# Patient Record
Sex: FEMALE | Race: OTHER | Employment: FULL TIME | URBAN - METROPOLITAN AREA
[De-identification: names, ages, dates, MRNs, and addresses within clinical notes are randomized per-mention and may not be internally consistent; named-entity substitution may affect disease eponyms.]

---

## 2020-11-16 ENCOUNTER — TELEPHONE (OUTPATIENT)
Dept: FAMILY MEDICINE | Facility: CLINIC | Age: 35
End: 2020-11-16

## 2020-11-16 ENCOUNTER — OFFICE VISIT (OUTPATIENT)
Dept: FAMILY MEDICINE | Facility: CLINIC | Age: 35
End: 2020-11-16
Payer: COMMERCIAL

## 2020-11-16 VITALS
BODY MASS INDEX: 43.59 KG/M2 | TEMPERATURE: 97 F | OXYGEN SATURATION: 98 % | DIASTOLIC BLOOD PRESSURE: 80 MMHG | WEIGHT: 271.25 LBS | HEIGHT: 66 IN | HEART RATE: 92 BPM | SYSTOLIC BLOOD PRESSURE: 124 MMHG

## 2020-11-16 DIAGNOSIS — M25.562 CHRONIC PAIN OF BOTH KNEES: ICD-10-CM

## 2020-11-16 DIAGNOSIS — Z11.4 SCREENING FOR HIV (HUMAN IMMUNODEFICIENCY VIRUS): ICD-10-CM

## 2020-11-16 DIAGNOSIS — G89.29 CHRONIC PAIN OF BOTH KNEES: ICD-10-CM

## 2020-11-16 DIAGNOSIS — R00.0 TACHYCARDIA: ICD-10-CM

## 2020-11-16 DIAGNOSIS — Z01.419 ENCOUNTER FOR ANNUAL ROUTINE GYNECOLOGICAL EXAMINATION: ICD-10-CM

## 2020-11-16 DIAGNOSIS — Z00.00 WELLNESS EXAMINATION: ICD-10-CM

## 2020-11-16 DIAGNOSIS — Z13.6 SCREENING FOR CARDIOVASCULAR CONDITION: ICD-10-CM

## 2020-11-16 DIAGNOSIS — Z23 NEED FOR INFLUENZA VACCINATION: Primary | ICD-10-CM

## 2020-11-16 DIAGNOSIS — M25.561 CHRONIC PAIN OF BOTH KNEES: ICD-10-CM

## 2020-11-16 PROCEDURE — 3008F PR BODY MASS INDEX (BMI) DOCUMENTED: ICD-10-PCS | Mod: CPTII,S$GLB,, | Performed by: STUDENT IN AN ORGANIZED HEALTH CARE EDUCATION/TRAINING PROGRAM

## 2020-11-16 PROCEDURE — 90686 FLU VACCINE (QUAD) GREATER THAN OR EQUAL TO 3YO PRESERVATIVE FREE IM: ICD-10-PCS | Mod: S$GLB,,, | Performed by: STUDENT IN AN ORGANIZED HEALTH CARE EDUCATION/TRAINING PROGRAM

## 2020-11-16 PROCEDURE — 1126F PR PAIN SEVERITY QUANTIFIED, NO PAIN PRESENT: ICD-10-PCS | Mod: S$GLB,,, | Performed by: STUDENT IN AN ORGANIZED HEALTH CARE EDUCATION/TRAINING PROGRAM

## 2020-11-16 PROCEDURE — 3008F BODY MASS INDEX DOCD: CPT | Mod: CPTII,S$GLB,, | Performed by: STUDENT IN AN ORGANIZED HEALTH CARE EDUCATION/TRAINING PROGRAM

## 2020-11-16 PROCEDURE — 99204 OFFICE O/P NEW MOD 45 MIN: CPT | Mod: 25,S$GLB,, | Performed by: STUDENT IN AN ORGANIZED HEALTH CARE EDUCATION/TRAINING PROGRAM

## 2020-11-16 PROCEDURE — 90686 IIV4 VACC NO PRSV 0.5 ML IM: CPT | Mod: S$GLB,,, | Performed by: STUDENT IN AN ORGANIZED HEALTH CARE EDUCATION/TRAINING PROGRAM

## 2020-11-16 PROCEDURE — 90471 FLU VACCINE (QUAD) GREATER THAN OR EQUAL TO 3YO PRESERVATIVE FREE IM: ICD-10-PCS | Mod: S$GLB,,, | Performed by: STUDENT IN AN ORGANIZED HEALTH CARE EDUCATION/TRAINING PROGRAM

## 2020-11-16 PROCEDURE — 99204 PR OFFICE/OUTPT VISIT, NEW, LEVL IV, 45-59 MIN: ICD-10-PCS | Mod: 25,S$GLB,, | Performed by: STUDENT IN AN ORGANIZED HEALTH CARE EDUCATION/TRAINING PROGRAM

## 2020-11-16 PROCEDURE — 1126F AMNT PAIN NOTED NONE PRSNT: CPT | Mod: S$GLB,,, | Performed by: STUDENT IN AN ORGANIZED HEALTH CARE EDUCATION/TRAINING PROGRAM

## 2020-11-16 PROCEDURE — 90471 IMMUNIZATION ADMIN: CPT | Mod: S$GLB,,, | Performed by: STUDENT IN AN ORGANIZED HEALTH CARE EDUCATION/TRAINING PROGRAM

## 2020-11-16 RX ORDER — AMLODIPINE BESYLATE 10 MG/1
10 TABLET ORAL DAILY
COMMUNITY

## 2020-11-16 RX ORDER — ATENOLOL 50 MG/1
50 TABLET ORAL DAILY
COMMUNITY

## 2020-11-16 NOTE — PROGRESS NOTES
" Patient ID: Shobha Lance is a 34 y.o. female. This patient is new to me.    Chief Complaint: Annual Exam, Hypertension, Knee Pain, and Joint Swelling (right knee)    Hypertension  This is a chronic problem. Episode onset: diagnosed at the age of 29. The problem has been gradually improving (patient reports trying to lose weight) since onset. The problem is controlled. Pertinent negatives include no anxiety, chest pain, headaches, malaise/fatigue, peripheral edema, shortness of breath or sweats. (Tachycardia) There are no associated agents to hypertension. Risk factors for coronary artery disease include sedentary lifestyle.   Knee Pain   Incident onset: approx 1.5 years ago. The incident occurred at home. Injury mechanism: patient feel like her knee was "popping" so she moved the knee cap, causing an effusion  The pain is present in the right knee and left knee. The pain is moderate. The pain has been fluctuating since onset. Associated symptoms include a loss of motion. Pertinent negatives include no loss of sensation, muscle weakness, numbness or tingling. She reports no foreign bodies present. The symptoms are aggravated by weight bearing. She has tried rest for the symptoms. The treatment provided mild relief.     Patient believes that left knee may be hurting from putting more weight on it while favoring the right leg.    Follows with cardiologist Dr. Barnard at [facility]. Patient is on atenolol and reports tachycardia if she tries to stop atenolol.     Past Medical History:   Diagnosis Date    Hyperlipidemia     Hypertension        History reviewed. No pertinent surgical history.    Family History   Problem Relation Age of Onset    Hypertension Father        Social History     Tobacco Use    Smoking status: Never Smoker    Smokeless tobacco: Never Used   Substance Use Topics    Alcohol use: Not Currently    Drug use: Never       Review of patient's allergies indicates:   Allergen Reactions    Lisinopril " "Other (See Comments)     Coughing        Review of Systems  Review of Systems   Constitutional: Negative for fever and malaise/fatigue.   HENT: Negative for ear pain and sinus pain.    Eyes: Negative for discharge.   Respiratory: Negative for cough and shortness of breath.    Cardiovascular: Negative for chest pain and leg swelling.   Gastrointestinal: Negative for diarrhea, nausea and vomiting.   Genitourinary: Negative for urgency.   Musculoskeletal: Negative for myalgias.   Skin: Negative for rash.   Neurological: Negative for tingling, weakness, numbness and headaches.   Psychiatric/Behavioral: Negative for depression.   All other systems reviewed and are negative.      Currently Medications  Current Outpatient Medications on File Prior to Visit   Medication Sig Dispense Refill    amLODIPine (NORVASC) 10 MG tablet Take 10 mg by mouth once daily.      atenoloL (TENORMIN) 50 MG tablet Take 50 mg by mouth once daily.       No current facility-administered medications on file prior to visit.        Physical  Exam  Vitals:    11/16/20 0817   BP: 124/80   BP Location: Left arm   Patient Position: Sitting   Pulse: 92   Temp: 97.1 °F (36.2 °C)   TempSrc: Oral   SpO2: 98%   Weight: 123.1 kg (271 lb 4.4 oz)   Height: 5' 6" (1.676 m)      Physical Exam  Vitals signs and nursing note reviewed.   Constitutional:       General: She is not in acute distress.     Appearance: She is not ill-appearing.   HENT:      Head: Normocephalic and atraumatic.      Right Ear: External ear normal.      Left Ear: External ear normal.      Nose: Nose normal.      Mouth/Throat:      Mouth: Mucous membranes are moist.   Eyes:      Extraocular Movements: Extraocular movements intact.      Conjunctiva/sclera: Conjunctivae normal.   Neck:      Musculoskeletal: Normal range of motion.   Cardiovascular:      Rate and Rhythm: Normal rate and regular rhythm.      Pulses: Normal pulses.      Heart sounds: No murmur.   Pulmonary:      Effort: " Pulmonary effort is normal. No respiratory distress.      Breath sounds: No wheezing.   Abdominal:      General: There is no distension.      Palpations: Abdomen is soft. There is no mass.      Tenderness: There is no abdominal tenderness.   Musculoskeletal:         General: No swelling.      Right knee: She exhibits normal range of motion, no swelling and no effusion.      Left knee: She exhibits normal range of motion, no swelling and no effusion.      Comments: Crepitus in the right knee with flexion and extension   Skin:     Coloration: Skin is not jaundiced.      Findings: No rash.   Neurological:      General: No focal deficit present.      Mental Status: She is alert and oriented to person, place, and time.   Psychiatric:         Mood and Affect: Mood normal.         Thought Content: Thought content normal.         Labs:    Complete Blood Count  No results found for: RBC, HGB, HCT, MCV, MCH, MCHC, RDW, PLT, MPV, GRAN, LYMPH, MONO, EOS, BASO, GRAN, LYMPH, MONO, EOSINOPHIL, BASOPHIL, DIFFMETHOD    Comprehensive Metabolic Panel  No results found for: GLU, BUN, CREATININE, NA, K, CL, PROT, ALBUMIN, BILITOT, AST, ALKPHOS, CO2, ALT, ANIONGAP, EGFRNONAA, ESTGFRAFRICA    TSH  No results found for: TSH    Imaging:  No image results found.      Assessment/Plan:      ICD-10-CM ICD-9-CM   1. Need for influenza vaccination  Z23 V04.81   2. Encounter for annual routine gynecological examination  Z01.419 V72.31   3. Screening for cardiovascular condition  Z13.6 V81.2   4. Wellness examination  Z00.00 V70.0   5. Tachycardia  R00.0 785.0   6. Screening for HIV (human immunodeficiency virus)  Z11.4 V73.89     Need for influenza vaccination  -     Influenza - Quadrivalent *Preferred* (6 months+) (PF)    Encounter for annual routine gynecological examination    Screening for cardiovascular condition  -     Lipid Panel; Future; Expected date: 11/16/2020    Wellness examination  -     Ambulatory referral/consult to Obstetrics /  Gynecology; Future; Expected date: 11/23/2020  -     CBC Auto Differential; Future  -     Comprehensive Metabolic Panel; Future; Expected date: 11/16/2020    Tachycardia  -     TSH; Future; Expected date: 11/16/2020    Screening for HIV (human immunodeficiency virus)  -     HIV 1/2 Ag/Ab (4th Gen); Future; Expected date: 11/16/2020      HTN  - on atenolol and norvasc  - diagnosed at age 29  - patient instructed to use protection with sexual intercourse and to speak with a physician if she has plans to get pregnant.   - well controlled  - follows with cardiologist Dr. Akil Thomas    Tachycardia  - patient reports a history of tachycardia when previously trying to wean off of atenolol. Will check TSH  - no tachycardia on exam today    Bilateral knee pain  - crepitus on exam of R knee  - patient reports a history of joint effusions in both knees  - was seeing a doctor in [place] and was supposed to get MRIs done, but she did not follow up.     Discussed how to stay healthy including: diet, exercise, refraining from smoking and discussed screening exams / tests needed for age, sex and family Hx.    RTC 6 months    Fran Elizabeth MD

## 2020-11-17 ENCOUNTER — TELEPHONE (OUTPATIENT)
Dept: FAMILY MEDICINE | Facility: CLINIC | Age: 35
End: 2020-11-17

## 2020-11-17 ENCOUNTER — HOSPITAL ENCOUNTER (OUTPATIENT)
Dept: RADIOLOGY | Facility: HOSPITAL | Age: 35
Discharge: HOME | End: 2020-11-17
Attending: STUDENT IN AN ORGANIZED HEALTH CARE EDUCATION/TRAINING PROGRAM
Payer: COMMERCIAL

## 2020-11-17 DIAGNOSIS — M25.569 KNEE PAIN, UNSPECIFIED CHRONICITY, UNSPECIFIED LATERALITY: ICD-10-CM

## 2020-11-17 DIAGNOSIS — M25.561 CHRONIC PAIN OF RIGHT KNEE: ICD-10-CM

## 2020-11-17 DIAGNOSIS — M25.562 CHRONIC PAIN OF BOTH KNEES: ICD-10-CM

## 2020-11-17 DIAGNOSIS — G89.29 CHRONIC PAIN OF RIGHT KNEE: ICD-10-CM

## 2020-11-17 DIAGNOSIS — M25.461 EFFUSION OF RIGHT KNEE: ICD-10-CM

## 2020-11-17 DIAGNOSIS — G89.29 CHRONIC PAIN OF BOTH KNEES: ICD-10-CM

## 2020-11-17 DIAGNOSIS — M25.561 CHRONIC PAIN OF BOTH KNEES: ICD-10-CM

## 2020-11-17 PROCEDURE — 73560 X-RAY EXAM OF KNEE 1 OR 2: CPT | Mod: TC,FY,PO,RT

## 2020-11-17 PROCEDURE — 73560 X-RAY EXAM OF KNEE 1 OR 2: CPT | Mod: TC,FY,PO,LT

## 2020-11-17 NOTE — TELEPHONE ENCOUNTER
Shaista Peters Staff   Caller: Unspecified (Today,  2:26 PM)             Type:  Patient Returning Call     Who Called:patient   Who Left Message for Patient:office   Does the patient know what this is regarding?:no   Would the patient rather a call back or a response via [facility]? Call back   Best Call Back Number:[phone]   Additional Information: n/a

## 2020-11-17 NOTE — TELEPHONE ENCOUNTER
Notified pt of results, and someone from scheduling will call pt to schedule MRI.   PT asked if a MRI of left knee could be too.

## 2020-11-17 NOTE — TELEPHONE ENCOUNTER
----- Message from Fran Elizabeth MD sent at 11/17/2020  9:39 AM CST -----  xrays of the knees did not show anything abnormal. I have ordered an MRI of the right knee.

## 2020-11-18 ENCOUNTER — TELEPHONE (OUTPATIENT)
Dept: FAMILY MEDICINE | Facility: CLINIC | Age: 35
End: 2020-11-18

## 2020-11-18 ENCOUNTER — HOSPITAL ENCOUNTER (OUTPATIENT)
Dept: RADIOLOGY | Facility: HOSPITAL | Age: 35
Discharge: HOME | End: 2020-11-18
Attending: STUDENT IN AN ORGANIZED HEALTH CARE EDUCATION/TRAINING PROGRAM
Payer: COMMERCIAL

## 2020-11-18 DIAGNOSIS — M25.461 EFFUSION OF RIGHT KNEE: ICD-10-CM

## 2020-11-18 DIAGNOSIS — G89.29 CHRONIC PAIN OF RIGHT KNEE: ICD-10-CM

## 2020-11-18 DIAGNOSIS — M25.569 KNEE PAIN, UNSPECIFIED CHRONICITY, UNSPECIFIED LATERALITY: ICD-10-CM

## 2020-11-18 DIAGNOSIS — M25.561 CHRONIC PAIN OF RIGHT KNEE: ICD-10-CM

## 2020-11-18 DIAGNOSIS — M94.20 CHONDROMALACIA: Primary | ICD-10-CM

## 2020-11-18 DIAGNOSIS — R74.01 TRANSAMINITIS: ICD-10-CM

## 2020-11-18 PROCEDURE — 73721 MRI JNT OF LWR EXTRE W/O DYE: CPT | Mod: TC,PO,LT

## 2020-11-18 PROCEDURE — 73721 MRI JNT OF LWR EXTRE W/O DYE: CPT | Mod: TC,PO,RT

## 2020-11-18 NOTE — TELEPHONE ENCOUNTER
MD LESA Chase Staff             Please call the patient regarding her MRIs. Both knees show thinning of the cartilage in the knee. Physical therapy to strengthen the muscles around the joints and weight loss will help this. Please ask if she would like a referral for physical therapy, and if so, where would she like to

## 2020-11-18 NOTE — TELEPHONE ENCOUNTER
Called patient on cell phone to discuss results. No answer. Left message requesting a return phone call with clinic phone number.     Cholesterol slightly elevated. No need to start medication at this time. Will likely improve with some weight loss.     Glucose mildly elevated. Decrease carb intake.     One liver test is elevated. Nothing to do now. We will continue to monitor it. Plan to repeat liver test in 6 months when you come back for follow up visit. I am placing the order now so that test can be done prior to our visit.     Thyroid normal.

## 2020-11-18 NOTE — TELEPHONE ENCOUNTER
notifed pt of all results. Pt stated she is fine with any PT. I suggested to patient [place] physical therapy, and pt was find with this

## 2020-11-23 ENCOUNTER — CLINICAL SUPPORT (OUTPATIENT)
Dept: REHABILITATION | Facility: HOSPITAL | Age: 35
End: 2020-11-23
Payer: COMMERCIAL

## 2020-11-23 DIAGNOSIS — M25.562 CHRONIC PAIN OF BOTH KNEES: ICD-10-CM

## 2020-11-23 DIAGNOSIS — G89.29 CHRONIC PAIN OF BOTH KNEES: ICD-10-CM

## 2020-11-23 DIAGNOSIS — R29.898 WEAKNESS OF BOTH HIPS: ICD-10-CM

## 2020-11-23 DIAGNOSIS — M25.561 CHRONIC PAIN OF BOTH KNEES: ICD-10-CM

## 2020-11-23 PROCEDURE — 97161 PT EVAL LOW COMPLEX 20 MIN: CPT | Mod: PO

## 2020-11-23 NOTE — PROGRESS NOTES
[facility]  Physical Therapy Initial Evaluation    Date: 11/23/2020   Name: Shobha Lance  Clinic Number: [id number]    Therapy Diagnosis:   Encounter Diagnoses   Name Primary?    Chronic pain of both knees     Weakness of both hips      Physician: Fran Elizabeth MD    Physician Orders: PT Eval and Treat   Medical Diagnosis from Referral: M94.20 (ICD-10-CM) - Chondromalacia  Evaluation Date: 11/23/2020  Authorization Period Expiration: 12/31/2020  Plan of Care Expiration: 1/23/2020  Visit # / Visits authorized: 1/ 20    Time In: 1:00 pm  Time Out: 1:45 pm  Total Appointment Time (timed & untimed codes): 45 minutes    Precautions: Standard    Subjective   Date of onset: R knee about 2 years ago  History of current condition - Shobha reports: Doesn't have pain constantly, just when she is doing physical activity she gets pain and swelling in both knees. On the R knee, she tried to crack it (by twisting it) one day a couple of years ago- heard a pop and got a pocket of swelling on the lateral side of the knee. Then with changing gait and activity due to pain in the R knee, the L knee started hurting. Pain really started ramping up again around March when pt reports doing more gardening and squatting down. No previous injuries to the knees, but has fractured both ankles and had some back pain while playing soccer that remains inconsistently. Does occasionally get some sciatic pain down the back of her legs- has been doing some yoga poses (piriformis stretch) and got a more ergonomic chair while sitting at work and feels that this has reduced some of her back pain.     Medical History:   Past Medical History:   Diagnosis Date    Hyperlipidemia     Hypertension        Surgical History:   Shobha Lance  has no past surgical history on file.    Medications:   Shobha has a current medication list which includes the following prescription(s): amlodipine and atenolol.    Allergies:   Review of  patient's allergies indicates:   Allergen Reactions    Lisinopril Other (See Comments)     Coughing        Imaging, X-Ray R knee: Impression: Normal study.  X-Ray L knee: Impression: No acute abnormality suggested.  MRI R knee: Impression:1. Moderate-sized joint effusion., 2. 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 Intact ligaments, tendons, and menisci.  MRI L knee: Impression: 1. Chondromalacia in the weight-bearing aspect of the medial femoral condyle as described above., 2. Otherwise unremarkable left knee MRI.    Prior Therapy: Has never done PT before  Social History: 1 flight of stairs lives with their family- parents  Occupation: works on computers all day.  Prior Level of Function: able to do everything with no restrictions and no pain  Current Level of Function: not able to exercise as she would like, otherwise able to do most things just with pain.    Pain:  Current 3/10, worst 6/10, best 0/10   Location: bilateral knees    Description: Dull  Aggravating Factors: Bending, Walking, Lifting, Squatting and going up stairs  Easing Factors: rest    Patients goals: decrease pain, get back to exercising, reduce swelling feeling in knees    Objective     Observation: Ambulated with normal gait pattern.    Posture: Increased lumbar lordosis, knees in slight valgus in standing.      Range of Motion:   Knee Left active Left Passive Right Active R passive   Flexion 108  NT   Extension -3 0 -4 0     HIP ROM WFL      Lower Extremity Strength  Right LE  Left LE    Knee extension: 5/5 Knee extension: 5/5   Knee flexion: 4+/5 Knee flexion: 4+/5   Hip flexion: 4/5 Hip flexion: 4/5   Hip extension:  4/5 Hip extension: 4/5   Hip abduction: 3+/5 Hip abduction: 3+/5   Hip adduction: NT Hip adduction NT   Ankle dorsiflexion: 5/5 Ankle dorsiflexion: 5/5   Ankle plantarflexion: 5/5-seated Ankle plantarflexion: 5/5-seated         Special Tests: stress testing on  ligs all neg- same with calos's   Left Right   Valgus Stress Test Negative- slight discomfort in medial knee, but no increased movement Negative    Varus Stress test Negative  Negative    Lachman's test Negative  Negative    Posterior Lachman Negative Negative   Josephine's Test Negative Negative   Apley's Compression NT NT   Apley's Distraction NT NT   Thessaly's Test NT NT     Step down test: valgus and shaking bilaterally. R>L      Function:    - SLS R: 10 sec  - SLS L: 26 sec  - Squat: was able to perform a mini-squat. bilateral valgus and reported R knee felt weak.  -SL squat: had valgus and shaking bilaterally. R>L      Joint Mobility: Patellar mobility: equal bilaterally. Little movement in superior and inferior directions. Compression of patella into the femur caused no additional pain.    Palpation: No tenderness noted    Flexibility:   Ely's test: R = 85 degrees ; L = 90 degrees           Limitation/Restriction for FOTO Knee Survey    Therapist reviewed FOTO scores for Shobha Lance on 11/23/2020.   FOTO documents entered into [employer] - see Media section.    Limitation Score: 63%       TREATMENT   Treatment Time In: --  Treatment Time Out: --  Total Treatment time (time-based codes) separate from Evaluation: 0 minutes      Home Exercises and Patient Education Provided    Education provided:   - Role of PT and goals of therapy    Written Home Exercises Provided: no.  Exercises were reviewed and Shobha was able to demonstrate them prior to the end of the session.  Shobha demonstrated good  understanding of the education provided.     See EMR under not provided this visit- will provide next visit.    Assessment   Shobha is a 35 y.o. female referred to outpatient Physical Therapy with a medical diagnosis of chondromalacia. Patient presents with pain in both knees with physical activity, weakness in hip musculature,tightness in quad musculature, decreased muscular control with functional activities, and decreased  balance. Pt would benefit from therapeutic exercise to improve strength in hip musculature, improve balance and muscular control with dynamic activities, and stretch tight hip musculature to improve functional mobility, decrease pain, return patient to exercising without pain.    Patient prognosis is Excellent.   Patientt will benefit from skilled outpatient Physical Therapy to address the deficits stated above and in the chart below, provide patient /family education, and to maximize patientt's level of independence.     Plan of care discussed with patient: Yes  Patient's spiritual, cultural and educational needs considered and patient is agreeable to the plan of care and goals as stated below:     Anticipated Barriers for therapy: none    Medical Necessity is demonstrated by the following  History  Co-morbidities and personal factors that may impact the plan of care Co-morbidities:   HTN    Personal Factors:   no deficits     low   Examination  Body Structures and Functions, activity limitations and participation restrictions that may impact the plan of care Body Regions:   lower extremities    Body Systems:    strength  balance    Participation Restrictions:   exercising    Activity limitations:   Learning and applying knowledge  no deficits    General Tasks and Commands  no deficits    Communication  no deficits    Mobility  no deficits    Self care  no deficits    Domestic Life  no deficits    Interactions/Relationships  no deficits    Life Areas  no deficits    Community and Social Life  no deficits         low   Clinical Presentation stable and uncomplicated low   Decision Making/ Complexity Score: low     Goals:  Short Term Goals: 4 weeks   1. Pt will understand and be compliant with HEP to promote independent management of current diagnosis.  2. Pt will have improved Ely's test ROM degrees to at least 95 bilaterally to indicate increased extensibility of quad musculature to improve flexibility and decreased  pressure on anterior knee with squatting.  3. Pt will have increased hip abduction strength by at least one grade to improve muscular control of knee valgus with dynamic activities.  4. Pt will report subjective pain levels no greater than 4/10 with ADLs.    Long Term Goals: 8 weeks   1. Pt will have reduced FOTO limitation score from 63% to 37% to indicate improved functional mobility.  2. Pt will have increased hip strength to 5/5 to improve ability to perform ADLS such as squatting down to pick items up off the floor.  3. Pt will be able to perform step down test with no valgus noted and no pain to indicate improved muscular control during dynamic activities to improve ability to return to exercising without pain and limitation.  4. Pt will report subjective pain levels no greater than 1/10 while performing ADLs.    Plan   Plan of care Certification: 11/23/2020 to 1/23/2020.    Outpatient Physical Therapy 2 times weekly for 8 weeks to include the following interventions: Electrical Stimulation IFC, Gait Training, Manual Therapy, Moist Heat/ Ice, Neuromuscular Re-ed, Orthotic Management and Training, Patient Education, Self Care, Therapeutic Activites, Therapeutic Exercise, Ultrasound and taping and dry needling.     Ana Rosa Donnelly, PT

## 2020-11-24 PROBLEM — M25.569 KNEE PAIN: Status: ACTIVE | Noted: 2020-11-24

## 2020-11-24 PROBLEM — R29.898 WEAKNESS OF BOTH HIPS: Status: ACTIVE | Noted: 2020-11-24

## 2020-11-25 ENCOUNTER — CLINICAL SUPPORT (OUTPATIENT)
Dept: REHABILITATION | Facility: HOSPITAL | Age: 35
End: 2020-11-25
Payer: COMMERCIAL

## 2020-11-25 DIAGNOSIS — M25.561 CHRONIC PAIN OF BOTH KNEES: ICD-10-CM

## 2020-11-25 DIAGNOSIS — R29.898 WEAKNESS OF BOTH HIPS: ICD-10-CM

## 2020-11-25 DIAGNOSIS — G89.29 CHRONIC PAIN OF BOTH KNEES: ICD-10-CM

## 2020-11-25 DIAGNOSIS — M25.562 CHRONIC PAIN OF BOTH KNEES: ICD-10-CM

## 2020-11-25 PROCEDURE — 97110 THERAPEUTIC EXERCISES: CPT | Mod: PO

## 2020-11-25 NOTE — PROGRESS NOTES
"  Physical Therapy Treatment Note     Name: Shobha Lance  Clinic Number: [id number]    Therapy Diagnosis:   Encounter Diagnoses   Name Primary?    Chronic pain of both knees     Weakness of both hips      Physician: Fran Elizabeth MD    Visit Date: 11/25/2020    Physician Orders: PT Eval and Treat   Medical Diagnosis from Referral: M94.20 (ICD-10-CM) - Chondromalacia  Evaluation Date: 11/23/2020  Authorization Period Expiration: 12/31/2020  Plan of Care Expiration: 1/23/2020  Visit # / Visits authorized: 1/ 20    Time In: 9:33 am  Time Out: 10:15 am  Total Billable Time: 42 minutes    Precautions: Standard    Subjective     Pt reports: her knees feel pretty good.  She was compliant with home exercise program.  Response to previous treatment: favorable- felt fine  Functional change: none at the moment    Pain: 0/10  Location: bilateral knee      Objective     Shobha received therapeutic exercises to develop strength and core stabilization for 42 minutes including:  Date  11/25/2020   VISIT 1/20   FOTO 2/5   POC EXP. DATE 1/23/2020   FACE-TO-FACE 12/23/2020       TABLE:    SAQ 2 x 10   Hip adduction 20x 3"   SLR 1 x 10   Hip abd 2 x 10   TA 2 x 10 3"   bridges 2 x 10   Hip flexor stretch 2 x 20" B   Hamstring stretch 2 x 20" B   SEATED:    LAQ 2 x 10           STANDING:                        Initials LT         Home Exercises Provided and Patient Education Provided     Education provided:   - HEP    Written Home Exercises Provided: yes.  Exercises were reviewed and Shobha was able to demonstrate them prior to the end of the session.  Shobha demonstrated good  understanding of the education provided.     See EMR under Patient Instructions for exercises provided 11/25/2020.    Assessment     Tolerated all exercises well with no increase in symptoms. Pt fatigued quickly with all exercises as she has not worked out in a while. Quad exercises were especially difficult on the L LE. Provided pt with exercises to perform " at home to continue seeing improvements in strength and endurance.  Shobha is progressing well towards her goals.   Pt prognosis is Excellent.     Pt will continue to benefit from skilled outpatient physical therapy to address the deficits listed in the problem list box on initial evaluation, provide pt/family education and to maximize pt's level of independence in the home and community environment.     Pt's spiritual, cultural and educational needs considered and pt agreeable to plan of care and goals.     Anticipated barriers to physical therapy: none    Goals:   Short Term Goals: 4 weeks   1. Pt will understand and be compliant with HEP to promote independent management of current diagnosis.  2. Pt will have improved Ely's test ROM degrees to at least 95 bilaterally to indicate increased extensibility of quad musculature to improve flexibility and decreased pressure on anterior knee with squatting.  3. Pt will have increased hip abduction strength by at least one grade to improve muscular control of knee valgus with dynamic activities.  4. Pt will report subjective pain levels no greater than 4/10 with ADLs.     Long Term Goals: 8 weeks   1. Pt will have reduced FOTO limitation score from 63% to 37% to indicate improved functional mobility.  2. Pt will have increased hip strength to 5/5 to improve ability to perform ADLS such as squatting down to pick items up off the floor.  3. Pt will be able to perform step down test with no valgus noted and no pain to indicate improved muscular control during dynamic activities to improve ability to return to exercising without pain and limitation.  4. Pt will report subjective pain levels no greater than 1/10 while performing ADLs.    Plan     Continue with PT POC as pt tolerates- knee extension and hip abduction especially.    Ana Rosa Donnelly, PT

## 2020-12-01 ENCOUNTER — CLINICAL SUPPORT (OUTPATIENT)
Dept: REHABILITATION | Facility: HOSPITAL | Age: 35
End: 2020-12-01
Payer: COMMERCIAL

## 2020-12-01 DIAGNOSIS — M25.562 CHRONIC PAIN OF BOTH KNEES: ICD-10-CM

## 2020-12-01 DIAGNOSIS — G89.29 CHRONIC PAIN OF BOTH KNEES: ICD-10-CM

## 2020-12-01 DIAGNOSIS — R29.898 WEAKNESS OF BOTH HIPS: ICD-10-CM

## 2020-12-01 DIAGNOSIS — M25.561 CHRONIC PAIN OF BOTH KNEES: ICD-10-CM

## 2020-12-01 PROCEDURE — 97110 THERAPEUTIC EXERCISES: CPT | Mod: PO

## 2020-12-01 NOTE — PROGRESS NOTES
"  Physical Therapy Treatment Note     Name: Shobha Lance  Clinic Number: [id number]    Therapy Diagnosis:   Encounter Diagnoses   Name Primary?    Chronic pain of both knees     Weakness of both hips      Physician: Fran Elizabeth MD    Visit Date: 12/1/2020    Physician Orders: PT Eval and Treat   Medical Diagnosis from Referral: M94.20 (ICD-10-CM) - Chondromalacia  Evaluation Date: 11/23/2020  Authorization Period Expiration: 12/31/2020  Plan of Care Expiration: 1/23/2020  Visit # / Visits authorized: 2/ 20    Time In: 5:45 pm  Time Out: 6:30 pm  Total Billable Time: 45 minutes    Precautions: Standard    Subjective     Pt reports: she had some muscle soreness following last treatment but no increase in knee pain.  She was compliant with home exercise program.  Response to previous treatment: favorable- some muscle soreness but no increase in knee  Functional change: none at the moment    Pain: 5/10  Location: bilateral knee      Objective     Shobha received therapeutic exercises to develop strength and core stabilization for 45 minutes including:  Date  12/1/2020 11/25/2020   VISIT 2/20 1/20   FOTO 3/5 2/5   POC EXP. DATE 1/23/2021 1/23/2020   FACE-TO-FACE 12/23/2020 12/23/2020        TABLE:     SAQ 2 x 10 2 x 10   Hip adduction 20 x 3" 20x 3"   SLR 1 x 10 1 x 10   Hip abd 2 x 10 2 x 10   TA 20 x 3" hold 2 x 10 3"   bridges 2 x 10 2 x 10   Hip flexor stretch 2 x 20" B 2 x 20" B   Hamstring stretch 3 x 20" B 2 x 20" B   SEATED:     LAQ 2 x 10 2 x 10             STANDING:                                Initials MA LT         Home Exercises Provided and Patient Education Provided     Education provided:   - HEP    Written Home Exercises Provided: yes.  Exercises were reviewed and Shobha was able to demonstrate them prior to the end of the session.  Shobha demonstrated good  understanding of the education provided.     See EMR under Patient Instructions for exercises provided 11/25/2020.    Assessment     Shobha " returns to therapy with continued reports of knee pain during normal daily activities.  She is unable to progress with exercises on this date and requires frequent rest breaks with continued weakness and fatigue noted during eccentric phase of exercises.  Pt reports performing HEP and having muscle soreness following last treatment.  She will continue with current plan of care with gradual progression of exercises to improve strength and stability of bilateral knees.    Shobha is progressing well towards her goals.   Pt prognosis is Excellent.     Pt will continue to benefit from skilled outpatient physical therapy to address the deficits listed in the problem list box on initial evaluation, provide pt/family education and to maximize pt's level of independence in the home and community environment.     Pt's spiritual, cultural and educational needs considered and pt agreeable to plan of care and goals.     Anticipated barriers to physical therapy: none    Goals:   Short Term Goals: 4 weeks   1. Pt will understand and be compliant with HEP to promote independent management of current diagnosis.  2. Pt will have improved Ely's test ROM degrees to at least 95 bilaterally to indicate increased extensibility of quad musculature to improve flexibility and decreased pressure on anterior knee with squatting.  3. Pt will have increased hip abduction strength by at least one grade to improve muscular control of knee valgus with dynamic activities.  4. Pt will report subjective pain levels no greater than 4/10 with ADLs.     Long Term Goals: 8 weeks   1. Pt will have reduced FOTO limitation score from 63% to 37% to indicate improved functional mobility.  2. Pt will have increased hip strength to 5/5 to improve ability to perform ADLS such as squatting down to pick items up off the floor.  3. Pt will be able to perform step down test with no valgus noted and no pain to indicate improved muscular control during dynamic  activities to improve ability to return to exercising without pain and limitation.  4. Pt will report subjective pain levels no greater than 1/10 while performing ADLs.    Plan     Continue with PT POC as pt tolerates.  Attempt progression of repetitions next visit.    John Washington, PT

## 2020-12-08 ENCOUNTER — CLINICAL SUPPORT (OUTPATIENT)
Dept: REHABILITATION | Facility: HOSPITAL | Age: 35
End: 2020-12-08
Payer: COMMERCIAL

## 2020-12-08 DIAGNOSIS — M25.562 CHRONIC PAIN OF BOTH KNEES: ICD-10-CM

## 2020-12-08 DIAGNOSIS — R29.898 WEAKNESS OF BOTH HIPS: ICD-10-CM

## 2020-12-08 DIAGNOSIS — G89.29 CHRONIC PAIN OF BOTH KNEES: ICD-10-CM

## 2020-12-08 DIAGNOSIS — M25.561 CHRONIC PAIN OF BOTH KNEES: ICD-10-CM

## 2020-12-08 PROCEDURE — 97110 THERAPEUTIC EXERCISES: CPT | Mod: PO

## 2020-12-08 NOTE — PROGRESS NOTES
"  Physical Therapy Treatment Note     Name: Shobha Lance  Clinic Number: [id number]    Therapy Diagnosis:   Encounter Diagnoses   Name Primary?    Chronic pain of both knees     Weakness of both hips      Physician: Fran Elizabeth MD    Visit Date: 12/8/2020    Physician Orders: PT Eval and Treat   Medical Diagnosis from Referral: M94.20 (ICD-10-CM) - Chondromalacia  Evaluation Date: 11/23/2020  Authorization Period Expiration: 12/31/2020  Plan of Care Expiration: 1/23/2020  Visit # / Visits authorized: 3/ 20    Time In: 5:49 pm  Time Out: 6:30 pm  Total Billable Time: 41 minutes    Precautions: Standard    Subjective     Pt reports: she can tell a difference and feels like the exercises are working. Is experiencing less pain with activities like going up and down stairs.  She was compliant with home exercise program.  Response to previous treatment: favorable- some muscle soreness but no increase in knee pain  Functional change: less pain with going up and down stairs.    Pain: 0/10   Location: bilateral knee      Objective     Shobha received therapeutic exercises to develop strength and core stabilization for 41 minutes including:  Date  12/8/2020 12/1/2020 11/25/2020   VISIT 3/20 2/20 1/20   FOTO 4/5 3/5 2/5   POC EXP. DATE 1/23/2021 1/23/2021 1/23/2020   FACE-TO-FACE 12/23/2020 12/23/2020 12/23/2020         TABLE:      SAQ 3 x 10 2 x 10 2 x 10   Hip adduction  20 x 3" 20x 3"   SLR 2 x 10 1 x 10 1 x 10   Hip abd 3 x 10 2 x 10 2 x 10   TA 20x 3" hold 20 x 3" hold 2 x 10 3"   bridges 2 x10 2 x 10 2 x 10   Hip flexor stretch 2 x 20" B 2 x 20" B 2 x 20" B   Hamstring stretch 3 x 20" B 3 x 20" B 2 x 20" B         SEATED:      LAQ  2 x 10 2 x 10               STANDING:                                      Initials LT MA LT         Home Exercises Provided and Patient Education Provided     Education provided:   - HEP    Written Home Exercises Provided: yes.  Exercises were reviewed and Shobha was able to demonstrate " them prior to the end of the session.  Shobha demonstrated good  understanding of the education provided.     See EMR under Patient Instructions for exercises provided 11/25/2020.    Assessment     Shobha tolerated a progression of reps with all exercises with no increase in symptoms. Still moves a little slowly through exercises, though is requiring less frequent rest breaks. Reports having been doing exercises at home and feels like they have really been helping and she can tell a difference in pain levels with everyday activities like going up and down stairs.  Shobha is progressing well towards her goals.   Pt prognosis is Excellent.     Pt will continue to benefit from skilled outpatient physical therapy to address the deficits listed in the problem list box on initial evaluation, provide pt/family education and to maximize pt's level of independence in the home and community environment.     Pt's spiritual, cultural and educational needs considered and pt agreeable to plan of care and goals.     Anticipated barriers to physical therapy: none    Goals:   Short Term Goals: 4 weeks   1. Pt will understand and be compliant with HEP to promote independent management of current diagnosis.  2. Pt will have improved Ely's test ROM degrees to at least 95 bilaterally to indicate increased extensibility of quad musculature to improve flexibility and decreased pressure on anterior knee with squatting.  3. Pt will have increased hip abduction strength by at least one grade to improve muscular control of knee valgus with dynamic activities.  4. Pt will report subjective pain levels no greater than 4/10 with ADLs.     Long Term Goals: 8 weeks   1. Pt will have reduced FOTO limitation score from 63% to 37% to indicate improved functional mobility.  2. Pt will have increased hip strength to 5/5 to improve ability to perform ADLS such as squatting down to pick items up off the floor.  3. Pt will be able to perform step down  test with no valgus noted and no pain to indicate improved muscular control during dynamic activities to improve ability to return to exercising without pain and limitation.  4. Pt will report subjective pain levels no greater than 1/10 while performing ADLs.    Plan     Continue with PT POC as pt tolerates.      Ana Rosa Donnelly, PT

## 2020-12-11 ENCOUNTER — CLINICAL SUPPORT (OUTPATIENT)
Dept: REHABILITATION | Facility: HOSPITAL | Age: 35
End: 2020-12-11
Payer: COMMERCIAL

## 2020-12-11 DIAGNOSIS — R29.898 WEAKNESS OF BOTH HIPS: Primary | ICD-10-CM

## 2020-12-11 PROCEDURE — 97110 THERAPEUTIC EXERCISES: CPT | Mod: PO,CQ

## 2020-12-11 NOTE — PROGRESS NOTES
"  Physical Therapy Treatment Note     Name: Shobha Lance  Clinic Number: [id number]    Therapy Diagnosis:   Encounter Diagnosis   Name Primary?    Weakness of both hips Yes     Physician: Fran Elizabeth MD    Visit Date: 12/11/2020    Physician Orders: PT Eval and Treat   Medical Diagnosis from Referral: M94.20 (ICD-10-CM) - Chondromalacia  Evaluation Date: 11/23/2020  Authorization Period Expiration: 12/31/2020  Plan of Care Expiration: 1/23/2020  Visit # / Visits authorized: 4 / 20    Time In: 8:05 am  Time Out: 8:45 am  Total Billable Time: 40 minutes    Precautions: Standard    Subjective     Pt reports: feeling good today, knee only bothers her if she goes up and down a lot of stairs.   She was compliant with home exercise program.  Response to previous treatment: favorable- some muscle soreness but no increase in knee pain  Functional change: less pain with going up and down stairs.    Pain: 0/10   Location: bilateral knee      Objective     Shobha received therapeutic exercises to develop strength and core stabilization for 40 minutes including:    Date  12/11/2020 12/8/2020 12/1/2020 11/25/2020   VISIT 4/20 3/20 2/20 1/20   FOTO 5/5 4/5 3/5 2/5   POC EXP. DATE 021/23/2021 1/23/2021 1/23/2021 1/23/2020   FACE-TO-FACE 12/23/2020 12/23/2020 12/23/2020 12/23/2020          TABLE:       SAQ 3 x 10 x 1# 3 x 10 2 x 10 2 x 10   Hip adduction SL 2 x 10  20 x 3" 20x 3"   SLR 2 x 10 2 x 10 1 x 10 1 x 10   SL Hip abd 2 x 10 3 x 10 2 x 10 2 x 10   TA NT 20 x 3" hold 20 x 3" hold 2 x 10 3"   Bridges 2 x 10 2 x 10 2 x 10 2 x 10   Hip flexor stretch 3 x 20" B  w/strap 2 x 20" B 2 x 20" B 2 x 20" B   Hamstring stretch 3 x 20" B 3 x 20" B 3 x 20" B 2 x 20" B          SEATED:       LAQ --  2 x 10 2 x 10   Hip Flexion --      Hamstring Curls --             STANDING:       Mini Squats       Step ups                            Initials SB 1/6 LT MA LT     Functional limitation reporting disability percentage was obtained through " use of FOTO LEFS Knee Survey indicating 64% limitation.     Home Exercises Provided and Patient Education Provided     Education provided:   - HEP    Written Home Exercises Provided: yes.  Exercises were reviewed and Shobha was able to demonstrate them prior to the end of the session.  Shobha demonstrated good  understanding of the education provided.     See EMR under Patient Instructions for exercises provided 11/25/2020.    Assessment     Shobha returns to therapy with no new complaints at this time. Pt was able to complete full routine above with increased time taken for rest breaks and slow speed of movements. Pt continues to have significant quadriceps weakness in bilateral LE's, although is improving. Pt reports she has been consistent with HEP and that it is getting easier to perform. She had a decrease on FOTO questionnaire today by one point, which mostly highlighted squatting difficulties and carrying heavy objects.     Shobha is progressing well towards her goals.   Pt prognosis is Excellent.     Pt will continue to benefit from skilled outpatient physical therapy to address the deficits listed in the problem list box on initial evaluation, provide pt/family education and to maximize pt's level of independence in the home and community environment.     Pt's spiritual, cultural and educational needs considered and pt agreeable to plan of care and goals.     Anticipated barriers to physical therapy: none    Goals:   Short Term Goals: 4 weeks   1. Pt will understand and be compliant with HEP to promote independent management of current diagnosis.  2. Pt will have improved Ely's test ROM degrees to at least 95 bilaterally to indicate increased extensibility of quad musculature to improve flexibility and decreased pressure on anterior knee with squatting.  3. Pt will have increased hip abduction strength by at least one grade to improve muscular control of knee valgus with dynamic activities.  4. Pt will  report subjective pain levels no greater than 4/10 with ADLs.     Long Term Goals: 8 weeks   1. Pt will have reduced FOTO limitation score from 63% to 37% to indicate improved functional mobility.  2. Pt will have increased hip strength to 5/5 to improve ability to perform ADLS such as squatting down to pick items up off the floor.  3. Pt will be able to perform step down test with no valgus noted and no pain to indicate improved muscular control during dynamic activities to improve ability to return to exercising without pain and limitation.  4. Pt will report subjective pain levels no greater than 1/10 while performing ADLs.    Plan     Continue with PT POC as pt tolerates.      Saba Dalal, PTA

## 2020-12-18 ENCOUNTER — CLINICAL SUPPORT (OUTPATIENT)
Dept: REHABILITATION | Facility: HOSPITAL | Age: 35
End: 2020-12-18
Payer: COMMERCIAL

## 2020-12-18 DIAGNOSIS — R29.898 WEAKNESS OF BOTH HIPS: ICD-10-CM

## 2020-12-18 DIAGNOSIS — G89.29 CHRONIC PAIN OF BOTH KNEES: ICD-10-CM

## 2020-12-18 DIAGNOSIS — M25.562 CHRONIC PAIN OF BOTH KNEES: ICD-10-CM

## 2020-12-18 DIAGNOSIS — M25.561 CHRONIC PAIN OF BOTH KNEES: ICD-10-CM

## 2020-12-18 PROCEDURE — 97110 THERAPEUTIC EXERCISES: CPT | Mod: PO

## 2020-12-18 NOTE — PROGRESS NOTES
"  Physical Therapy Treatment Note     Name: Shobha Lance  Clinic Number: [id number]    Therapy Diagnosis:   Encounter Diagnoses   Name Primary?    Chronic pain of both knees     Weakness of both hips      Physician: Fran Elizabeth MD    Visit Date: 12/18/2020    Physician Orders: PT Eval and Treat   Medical Diagnosis from Referral: M94.20 (ICD-10-CM) - Chondromalacia  Evaluation Date: 11/23/2020  Authorization Period Expiration: 12/31/2020  Plan of Care Expiration: 1/23/2020  Visit # / Visits authorized: 5 / 20    Time In: 8:05 am  Time Out: 8:45 am  Total Billable Time: 40 minutes    Precautions: Standard    Subjective     Pt reports: her legs have been feeling stronger and she has been able to go up and down stairs more fluidly with less pain.  She was compliant with home exercise program.  Response to previous treatment: favorable- some muscle soreness but no increase in knee pain  Functional change: less pain with going up and down stairs.    Pain: 0/10   Location: bilateral knee      Objective     Shobha received therapeutic exercises to develop strength and core stabilization for 40 minutes including:    Date  12/18/2020 12/11/2020 12/8/2020 12/1/2020 11/25/2020   VISIT 5/20 4/20 3/20 2/20 1/20   FOTO  5/5 4/5 3/5 2/5   POC EXP. DATE 1/23/2021 021/23/2021 1/23/2021 1/23/2021 1/23/2020   FACE-TO-FACE 12/23/2020 12/23/2020 12/23/2020 12/23/2020 12/23/2020           TABLE:        SAQ 3 x 10 x 1# 3 x 10 x 1# 3 x 10 2 x 10 2 x 10   Hip adduction SL 2 x 10 SL 2 x 10  20 x 3" 20x 3"   SLR 2 x 10 2 x 10 2 x 10 1 x 10 1 x 10   SL Hip abd 2 x 10  2 x 10 3 x 10 2 x 10 2 x 10   TA  NT 20 x 3" hold 20 x 3" hold 2 x 10 3"   Bridges 2 x 10 2 x 10 2 x 10 2 x 10 2 x 10   Hip flexor stretch  3 x 20" B  w/strap 2 x 20" B 2 x 20" B 2 x 20" B   Hamstring stretch 3 x 20" B 3 x 20" B 3 x 20" B 3 x 20" B 2 x 20" B           SEATED:        LAQ 2 x 10 x 1# --  2 x 10 2 x 10   Hip Flexion  --      Hamstring Curls 2 x 10 RTB --         "      STANDING:        Mini Squats 1 x 10 w/ball       Step ups                                Initials LT SB 1/6 LT MA LT     Functional limitation reporting disability percentage was obtained through use of [facility] LEFS Knee Survey indicating 64% limitation.     Home Exercises Provided and Patient Education Provided     Education provided:   - HEP    Written Home Exercises Provided: yes.  Exercises were reviewed and Shobha was able to demonstrate them prior to the end of the session.  Shobha demonstrated good  understanding of the education provided.     See EMR under Patient Instructions for exercises provided 11/25/2020.    Assessment     Shobha's endurance is improving her endurance and was able to move more quickly through her table exercises today and do some seated and standing exercises. She tolerated all exercises well with only slight increase in fatigue noted at the end of the session. She required only 1 cue to not go to the point where she feels pain with mini-squats. She reports navigating stairs more fluidly with less pain.    Shobha is progressing well towards her goals.   Pt prognosis is Excellent.     Pt will continue to benefit from skilled outpatient physical therapy to address the deficits listed in the problem list box on initial evaluation, provide pt/family education and to maximize pt's level of independence in the home and community environment.     Pt's spiritual, cultural and educational needs considered and pt agreeable to plan of care and goals.     Anticipated barriers to physical therapy: none    Goals:   Short Term Goals: 4 weeks   1. Pt will understand and be compliant with HEP to promote independent management of current diagnosis.  2. Pt will have improved Ely's test ROM degrees to at least 95 bilaterally to indicate increased extensibility of quad musculature to improve flexibility and decreased pressure on anterior knee with squatting.  3. Pt will have increased hip abduction  strength by at least one grade to improve muscular control of knee valgus with dynamic activities.  4. Pt will report subjective pain levels no greater than 4/10 with ADLs.     Long Term Goals: 8 weeks   1. Pt will have reduced FOTO limitation score from 63% to 37% to indicate improved functional mobility.  2. Pt will have increased hip strength to 5/5 to improve ability to perform ADLS such as squatting down to pick items up off the floor.  3. Pt will be able to perform step down test with no valgus noted and no pain to indicate improved muscular control during dynamic activities to improve ability to return to exercising without pain and limitation.  4. Pt will report subjective pain levels no greater than 1/10 while performing ADLs.    Plan     Continue with PT POC as pt tolerates.      Ana Rosa Donnelly, PT

## 2020-12-23 ENCOUNTER — DOCUMENTATION ONLY (OUTPATIENT)
Dept: REHABILITATION | Facility: HOSPITAL | Age: 35
End: 2020-12-23

## 2020-12-23 ENCOUNTER — CLINICAL SUPPORT (OUTPATIENT)
Dept: REHABILITATION | Facility: HOSPITAL | Age: 35
End: 2020-12-23
Payer: COMMERCIAL

## 2020-12-23 DIAGNOSIS — G89.29 CHRONIC PAIN OF BOTH KNEES: ICD-10-CM

## 2020-12-23 DIAGNOSIS — M25.569 KNEE PAIN, UNSPECIFIED CHRONICITY, UNSPECIFIED LATERALITY: Primary | ICD-10-CM

## 2020-12-23 DIAGNOSIS — R29.898 WEAKNESS OF BOTH HIPS: ICD-10-CM

## 2020-12-23 DIAGNOSIS — M25.561 CHRONIC PAIN OF BOTH KNEES: ICD-10-CM

## 2020-12-23 DIAGNOSIS — M25.562 CHRONIC PAIN OF BOTH KNEES: ICD-10-CM

## 2020-12-23 PROCEDURE — 97110 THERAPEUTIC EXERCISES: CPT | Mod: PO

## 2020-12-23 NOTE — PROGRESS NOTES
Face to Face PTA Conference performed with Saba Dalal PTA regarding patient's current status, overall progress, and plan of care. Pt will be seen by a physical therapist minimally every 6th visit or every 30 days.    Raven Germain, PT  12/23/2020    Face to Face PTA Conference performed with Raven Germain PT regarding patient's current status, overall progress, and plan of care. Pt will be seen by a physical therapist minimally every 6th visit or every 30 days.    Saba Dalal PTA  12/23/2020

## 2020-12-23 NOTE — PROGRESS NOTES
"  Physical Therapy Treatment Note     Name: Shobha Lance  Clinic Number: [id number]    Therapy Diagnosis:   Encounter Diagnoses   Name Primary?    Chronic pain of both knees     Weakness of both hips      Physician: Fran Elizabeth MD    Visit Date: 12/23/2020    Physician Orders: PT Eval and Treat   Medical Diagnosis from Referral: M94.20 (ICD-10-CM) - Chondromalacia  Evaluation Date: 11/23/2020  Authorization Period Expiration: 12/31/2020  Plan of Care Expiration: 1/23/2020  Visit # / Visits authorized: 5 / 20    Time In: 5:45 pm  Time Out: 6:15 pm  Total Billable Time: 30 minutes    Precautions: Standard    Subjective     Pt reports: feeling better since starting therapy, did feel a little funny after doing that last exercise during her previous visit.   She was compliant with home exercise program.  Response to previous treatment: favorable- some muscle soreness but no increase in knee pain  Functional change: less pain with going up and down stairs.    Pain: 0/10   Location: bilateral knee      Objective     Shobha received therapeutic exercises to develop strength and core stabilization for 30 minutes including:    Date  12/23/2020 12/18/2020 12/11/2020 12/8/2020 12/1/2020 11/25/2020   VISIT 6/20 5/20 4/20 3/20 2/20 1/20   FOTO --  5/5 4/5 3/5 2/5   POC EXP. DATE 01/23/2021 1/23/2021 021/23/2021 1/23/2021 1/23/2021 1/23/2020   FACE-TO-FACE 01/23/2021 12/23/2020 12/23/2020 12/23/2020 12/23/2020 12/23/2020            TABLE:         SAQ 3 x 10 x 1# 3 x 10 x 1# 3 x 10 x 1# 3 x 10 2 x 10 2 x 10   Hip adduction SL 2 x 10 SL 2 x 10 SL 2 x 10  20 x 3" 20x 3"   SLR 3 x 10 2 x 10 2 x 10 2 x 10 1 x 10 1 x 10   SL Hip abd 2 x 10 2 x 10  2 x 10 3 x 10 2 x 10 2 x 10   TA --  NT 20 x 3" hold 20 x 3" hold 2 x 10 3"   Bridges 3 x 10 2 x 10 2 x 10 2 x 10 2 x 10 2 x 10   Hip flexor stretch Not today  3 x 20" B  w/strap 2 x 20" B 2 x 20" B 2 x 20" B   Hamstring stretch 3 x 20" B 3 x 20" B 3 x 20" B 3 x 20" B 3 x 20" B 2 x 20" B "            SEATED:         LAQ 3 x 10 x 1 # 2 x 10 x 1# --  2 x 10 2 x 10   Hip Flexion --  --      Hamstring Curls 2 x 10 RTB 2 x 10 RTB --               STANDING:         Mini Squats 1 x 15 w/ ball 1 x 10 w/ball       Step ups                                    Initials DG LT SB 1/6 LT MA LT       Home Exercises Provided and Patient Education Provided     Education provided:   - HEP    Written Home Exercises Provided: yes.  Exercises were reviewed and Shobha was able to demonstrate them prior to the end of the session.  Shobha demonstrated good  understanding of the education provided.     See EMR under Patient Instructions for exercises provided 11/25/2020.    Assessment     Shobha was able to perform all of today's exercises with no increase in symptoms prior to leaving the clinic. She did require one verbal cue for positioning with side lying exercises. She was able to perform all of today's exercises with no rest breaks demonstrating an increase in her endurance.     Shobha is progressing well towards her goals.   Pt prognosis is Excellent.     Pt will continue to benefit from skilled outpatient physical therapy to address the deficits listed in the problem list box on initial evaluation, provide pt/family education and to maximize pt's level of independence in the home and community environment.     Pt's spiritual, cultural and educational needs considered and pt agreeable to plan of care and goals.     Anticipated barriers to physical therapy: none    Goals:   Short Term Goals: 4 weeks   1. Pt will understand and be compliant with HEP to promote independent management of current diagnosis. In progress, not met  2. Pt will have improved Ely's test ROM degrees to at least 95 bilaterally to indicate increased extensibility of quad musculature to improve flexibility and decreased pressure on anterior knee with squatting. In progress, not met  3. Pt will have increased hip abduction strength by at least one grade  to improve muscular control of knee valgus with dynamic activities. In progress, not met  4. Pt will report subjective pain levels no greater than 4/10 with ADLs. In progress, not met     Long Term Goals: 8 weeks   1. Pt will have reduced FOTO limitation score from 63% to 37% to indicate improved functional mobility. In progress, not met  2. Pt will have increased hip strength to 5/5 to improve ability to perform ADLS such as squatting down to pick items up off the floor. In progress, not met  3. Pt will be able to perform step down test with no valgus noted and no pain to indicate improved muscular control during dynamic activities to improve ability to return to exercising without pain and limitation. In progress, not met  4. Pt will report subjective pain levels no greater than 1/10 while performing ADLs. In progress, not met    Plan     Continue with PT POC as pt tolerates. Increase reps with all exercises next visit.     Raven Germain, PT

## 2020-12-28 ENCOUNTER — TELEPHONE (OUTPATIENT)
Dept: FAMILY MEDICINE | Facility: CLINIC | Age: 35
End: 2020-12-28

## 2021-01-04 ENCOUNTER — PATIENT MESSAGE (OUTPATIENT)
Dept: ADMINISTRATIVE | Facility: HOSPITAL | Age: 36
End: 2021-01-04

## 2021-03-08 ENCOUNTER — PATIENT MESSAGE (OUTPATIENT)
Dept: FAMILY MEDICINE | Facility: CLINIC | Age: 36
End: 2021-03-08

## 2021-03-13 ENCOUNTER — IMMUNIZATION (OUTPATIENT)
Dept: INTERNAL MEDICINE | Facility: CLINIC | Age: 36
End: 2021-03-13
Payer: COMMERCIAL

## 2021-03-13 DIAGNOSIS — Z23 NEED FOR VACCINATION: Primary | ICD-10-CM

## 2021-03-13 PROCEDURE — 91300 COVID-19, MRNA, LNP-S, PF, 30 MCG/0.3 ML DOSE VACCINE: CPT | Mod: PBBFAC | Performed by: FAMILY MEDICINE

## 2021-03-27 ENCOUNTER — NURSE TRIAGE (OUTPATIENT)
Dept: ADMINISTRATIVE | Facility: CLINIC | Age: 36
End: 2021-03-27

## 2021-04-01 ENCOUNTER — PATIENT MESSAGE (OUTPATIENT)
Dept: FAMILY MEDICINE | Facility: CLINIC | Age: 36
End: 2021-04-01

## 2021-04-03 ENCOUNTER — IMMUNIZATION (OUTPATIENT)
Dept: INTERNAL MEDICINE | Facility: CLINIC | Age: 36
End: 2021-04-03
Payer: COMMERCIAL

## 2021-04-03 DIAGNOSIS — Z23 NEED FOR VACCINATION: Primary | ICD-10-CM

## 2021-04-03 PROCEDURE — 91300 COVID-19, MRNA, LNP-S, PF, 30 MCG/0.3 ML DOSE VACCINE: CPT | Mod: PBBFAC | Performed by: FAMILY MEDICINE

## 2021-04-03 PROCEDURE — 0002A COVID-19, MRNA, LNP-S, PF, 30 MCG/0.3 ML DOSE VACCINE: CPT | Mod: PBBFAC | Performed by: FAMILY MEDICINE

## 2021-04-05 ENCOUNTER — OFFICE VISIT (OUTPATIENT)
Dept: FAMILY MEDICINE | Facility: CLINIC | Age: 36
End: 2021-04-05
Payer: COMMERCIAL

## 2021-04-05 ENCOUNTER — PATIENT MESSAGE (OUTPATIENT)
Dept: ADMINISTRATIVE | Facility: HOSPITAL | Age: 36
End: 2021-04-05

## 2021-04-05 VITALS
SYSTOLIC BLOOD PRESSURE: 134 MMHG | DIASTOLIC BLOOD PRESSURE: 86 MMHG | OXYGEN SATURATION: 96 % | HEIGHT: 66 IN | TEMPERATURE: 98 F | WEIGHT: 275.13 LBS | BODY MASS INDEX: 44.22 KG/M2 | HEART RATE: 101 BPM

## 2021-04-05 DIAGNOSIS — H81.11 BENIGN PAROXYSMAL POSITIONAL VERTIGO OF RIGHT EAR: Primary | ICD-10-CM

## 2021-04-05 PROCEDURE — 3008F BODY MASS INDEX DOCD: CPT | Mod: CPTII,S$GLB,, | Performed by: STUDENT IN AN ORGANIZED HEALTH CARE EDUCATION/TRAINING PROGRAM

## 2021-04-05 PROCEDURE — 3008F PR BODY MASS INDEX (BMI) DOCUMENTED: ICD-10-PCS | Mod: CPTII,S$GLB,, | Performed by: STUDENT IN AN ORGANIZED HEALTH CARE EDUCATION/TRAINING PROGRAM

## 2021-04-05 PROCEDURE — 99213 PR OFFICE/OUTPT VISIT, EST, LEVL III, 20-29 MIN: ICD-10-PCS | Mod: S$GLB,,, | Performed by: STUDENT IN AN ORGANIZED HEALTH CARE EDUCATION/TRAINING PROGRAM

## 2021-04-05 PROCEDURE — 99213 OFFICE O/P EST LOW 20 MIN: CPT | Mod: S$GLB,,, | Performed by: STUDENT IN AN ORGANIZED HEALTH CARE EDUCATION/TRAINING PROGRAM

## 2021-04-05 RX ORDER — MECLIZINE HYDROCHLORIDE 25 MG/1
TABLET ORAL
COMMUNITY
Start: 2021-03-30 | End: 2021-04-12

## 2021-04-12 ENCOUNTER — OFFICE VISIT (OUTPATIENT)
Dept: OBSTETRICS AND GYNECOLOGY | Facility: CLINIC | Age: 36
End: 2021-04-12
Payer: COMMERCIAL

## 2021-04-12 VITALS
WEIGHT: 273.38 LBS | SYSTOLIC BLOOD PRESSURE: 126 MMHG | DIASTOLIC BLOOD PRESSURE: 82 MMHG | BODY MASS INDEX: 44.12 KG/M2

## 2021-04-12 DIAGNOSIS — Z00.00 WELLNESS EXAMINATION: ICD-10-CM

## 2021-04-12 DIAGNOSIS — Z01.419 WELL WOMAN EXAM WITH ROUTINE GYNECOLOGICAL EXAM: Primary | ICD-10-CM

## 2021-04-12 DIAGNOSIS — Z12.4 SCREENING FOR CERVICAL CANCER: ICD-10-CM

## 2021-04-12 DIAGNOSIS — N92.6 IRREGULAR BLEEDING: ICD-10-CM

## 2021-04-12 PROCEDURE — 99385 PR PREVENTIVE VISIT,NEW,18-39: ICD-10-PCS | Mod: S$GLB,,, | Performed by: OBSTETRICS & GYNECOLOGY

## 2021-04-12 PROCEDURE — 99999 PR PBB SHADOW E&M-EST. PATIENT-LVL III: CPT | Mod: PBBFAC,,, | Performed by: OBSTETRICS & GYNECOLOGY

## 2021-04-12 PROCEDURE — 3008F PR BODY MASS INDEX (BMI) DOCUMENTED: ICD-10-PCS | Mod: CPTII,S$GLB,, | Performed by: OBSTETRICS & GYNECOLOGY

## 2021-04-12 PROCEDURE — 99385 PREV VISIT NEW AGE 18-39: CPT | Mod: S$GLB,,, | Performed by: OBSTETRICS & GYNECOLOGY

## 2021-04-12 PROCEDURE — 87624 HPV HI-RISK TYP POOLED RSLT: CPT | Performed by: OBSTETRICS & GYNECOLOGY

## 2021-04-12 PROCEDURE — 88175 CYTOPATH C/V AUTO FLUID REDO: CPT | Performed by: STUDENT IN AN ORGANIZED HEALTH CARE EDUCATION/TRAINING PROGRAM

## 2021-04-12 PROCEDURE — 88141 PR  CYTOPATH CERV/VAG INTERPRET: ICD-10-PCS | Mod: ,,, | Performed by: STUDENT IN AN ORGANIZED HEALTH CARE EDUCATION/TRAINING PROGRAM

## 2021-04-12 PROCEDURE — 99999 PR PBB SHADOW E&M-EST. PATIENT-LVL III: ICD-10-PCS | Mod: PBBFAC,,, | Performed by: OBSTETRICS & GYNECOLOGY

## 2021-04-12 PROCEDURE — 3008F BODY MASS INDEX DOCD: CPT | Mod: CPTII,S$GLB,, | Performed by: OBSTETRICS & GYNECOLOGY

## 2021-04-12 PROCEDURE — 1126F AMNT PAIN NOTED NONE PRSNT: CPT | Mod: S$GLB,,, | Performed by: OBSTETRICS & GYNECOLOGY

## 2021-04-12 PROCEDURE — 88141 CYTOPATH C/V INTERPRET: CPT | Mod: ,,, | Performed by: STUDENT IN AN ORGANIZED HEALTH CARE EDUCATION/TRAINING PROGRAM

## 2021-04-12 PROCEDURE — 1126F PR PAIN SEVERITY QUANTIFIED, NO PAIN PRESENT: ICD-10-PCS | Mod: S$GLB,,, | Performed by: OBSTETRICS & GYNECOLOGY

## 2021-04-16 ENCOUNTER — HOSPITAL ENCOUNTER (OUTPATIENT)
Dept: RADIOLOGY | Facility: HOSPITAL | Age: 36
Discharge: HOME | End: 2021-04-16
Attending: OBSTETRICS & GYNECOLOGY
Payer: COMMERCIAL

## 2021-04-16 DIAGNOSIS — N92.6 IRREGULAR BLEEDING: ICD-10-CM

## 2021-04-16 LAB
HPV HR 12 DNA SPEC QL NAA+PROBE: NEGATIVE
HPV16 AG SPEC QL: NEGATIVE
HPV18 DNA SPEC QL NAA+PROBE: NEGATIVE

## 2021-04-19 ENCOUNTER — HOSPITAL ENCOUNTER (OUTPATIENT)
Dept: RADIOLOGY | Facility: HOSPITAL | Age: 36
Discharge: HOME | End: 2021-04-19
Attending: OBSTETRICS & GYNECOLOGY
Payer: COMMERCIAL

## 2021-04-19 DIAGNOSIS — N92.6 IRREGULAR BLEEDING: ICD-10-CM

## 2021-04-19 LAB
FINAL PATHOLOGIC DIAGNOSIS: NORMAL
Lab: NORMAL

## 2021-04-19 PROCEDURE — 76856 US EXAM PELVIC COMPLETE: CPT | Mod: TC,PO

## 2021-05-18 ENCOUNTER — OFFICE VISIT (OUTPATIENT)
Dept: FAMILY MEDICINE | Facility: CLINIC | Age: 36
End: 2021-05-18
Payer: COMMERCIAL

## 2021-05-18 VITALS
HEIGHT: 66 IN | TEMPERATURE: 98 F | OXYGEN SATURATION: 95 % | DIASTOLIC BLOOD PRESSURE: 84 MMHG | BODY MASS INDEX: 44.43 KG/M2 | SYSTOLIC BLOOD PRESSURE: 128 MMHG | WEIGHT: 276.44 LBS | HEART RATE: 81 BPM

## 2021-05-18 DIAGNOSIS — J01.10 ACUTE NON-RECURRENT FRONTAL SINUSITIS: Primary | ICD-10-CM

## 2021-05-18 PROCEDURE — 1126F AMNT PAIN NOTED NONE PRSNT: CPT | Mod: S$GLB,,, | Performed by: STUDENT IN AN ORGANIZED HEALTH CARE EDUCATION/TRAINING PROGRAM

## 2021-05-18 PROCEDURE — 99214 PR OFFICE/OUTPT VISIT, EST, LEVL IV, 30-39 MIN: ICD-10-PCS | Mod: S$GLB,,, | Performed by: STUDENT IN AN ORGANIZED HEALTH CARE EDUCATION/TRAINING PROGRAM

## 2021-05-18 PROCEDURE — 3008F PR BODY MASS INDEX (BMI) DOCUMENTED: ICD-10-PCS | Mod: CPTII,S$GLB,, | Performed by: STUDENT IN AN ORGANIZED HEALTH CARE EDUCATION/TRAINING PROGRAM

## 2021-05-18 PROCEDURE — 1126F PR PAIN SEVERITY QUANTIFIED, NO PAIN PRESENT: ICD-10-PCS | Mod: S$GLB,,, | Performed by: STUDENT IN AN ORGANIZED HEALTH CARE EDUCATION/TRAINING PROGRAM

## 2021-05-18 PROCEDURE — 3008F BODY MASS INDEX DOCD: CPT | Mod: CPTII,S$GLB,, | Performed by: STUDENT IN AN ORGANIZED HEALTH CARE EDUCATION/TRAINING PROGRAM

## 2021-05-18 PROCEDURE — 99214 OFFICE O/P EST MOD 30 MIN: CPT | Mod: S$GLB,,, | Performed by: STUDENT IN AN ORGANIZED HEALTH CARE EDUCATION/TRAINING PROGRAM

## 2021-05-18 RX ORDER — FLUTICASONE PROPIONATE 50 MCG
1 SPRAY, SUSPENSION (ML) NASAL 2 TIMES DAILY
Qty: 10 ML | Refills: 0 | Status: SHIPPED | OUTPATIENT
Start: 2021-05-18 | End: 2021-06-10

## 2021-06-07 ENCOUNTER — PATIENT MESSAGE (OUTPATIENT)
Dept: FAMILY MEDICINE | Facility: CLINIC | Age: 36
End: 2021-06-07

## 2021-06-10 DIAGNOSIS — J01.10 ACUTE NON-RECURRENT FRONTAL SINUSITIS: ICD-10-CM

## 2021-06-10 RX ORDER — FLUTICASONE PROPIONATE 50 MCG
SPRAY, SUSPENSION (ML) NASAL
Qty: 16 ML | Refills: 3 | Status: SHIPPED | OUTPATIENT
Start: 2021-06-10

## 2021-09-13 ENCOUNTER — PATIENT MESSAGE (OUTPATIENT)
Dept: FAMILY MEDICINE | Facility: CLINIC | Age: 36
End: 2021-09-13

## 2021-09-13 RX ORDER — METHYLPREDNISOLONE 4 MG/1
TABLET ORAL
Qty: 1 PACKAGE | Refills: 0 | Status: SHIPPED | OUTPATIENT
Start: 2021-09-13 | End: 2021-10-04

## 2021-12-06 ENCOUNTER — IMMUNIZATION (OUTPATIENT)
Dept: PHARMACY | Facility: CLINIC | Age: 36
End: 2021-12-06
Payer: COMMERCIAL

## 2021-12-06 DIAGNOSIS — Z23 NEED FOR VACCINATION: Primary | ICD-10-CM

## 2021-12-30 ENCOUNTER — PATIENT MESSAGE (OUTPATIENT)
Dept: FAMILY MEDICINE | Facility: CLINIC | Age: 36
End: 2021-12-30
Payer: COMMERCIAL

## 2021-12-30 RX ORDER — SULFAMETHOXAZOLE AND TRIMETHOPRIM 800; 160 MG/1; MG/1
1 TABLET ORAL 2 TIMES DAILY
Qty: 10 TABLET | Refills: 0 | Status: SHIPPED | OUTPATIENT
Start: 2021-12-30 | End: 2021-12-30 | Stop reason: SDUPTHER

## 2021-12-30 RX ORDER — SULFAMETHOXAZOLE AND TRIMETHOPRIM 800; 160 MG/1; MG/1
1 TABLET ORAL 2 TIMES DAILY
Qty: 10 TABLET | Refills: 0 | Status: SHIPPED | OUTPATIENT
Start: 2021-12-30 | End: 2022-01-04

## 2021-12-30 RX ORDER — SULFAMETHOXAZOLE AND TRIMETHOPRIM 800; 160 MG/1; MG/1
1 TABLET ORAL 2 TIMES DAILY
Qty: 10 TABLET | Refills: 0 | Status: SHIPPED | OUTPATIENT
Start: 2021-12-30 | End: 2021-12-30

## 2022-01-06 ENCOUNTER — OFFICE VISIT (OUTPATIENT)
Dept: FAMILY MEDICINE | Facility: CLINIC | Age: 37
End: 2022-01-06
Payer: COMMERCIAL

## 2022-01-06 VITALS
WEIGHT: 272.81 LBS | BODY MASS INDEX: 43.84 KG/M2 | SYSTOLIC BLOOD PRESSURE: 132 MMHG | HEART RATE: 89 BPM | OXYGEN SATURATION: 97 % | HEIGHT: 66 IN | DIASTOLIC BLOOD PRESSURE: 84 MMHG | TEMPERATURE: 98 F

## 2022-01-06 DIAGNOSIS — R05.9 COUGH: Primary | ICD-10-CM

## 2022-01-06 LAB
ADENOVIRUS: NOT DETECTED
BORDETELLA PARAPERTUSSIS (IS1001): NOT DETECTED
BORDETELLA PERTUSSIS (PTXP): NOT DETECTED
CHLAMYDIA PNEUMONIAE: NOT DETECTED
CORONAVIRUS 229E, COMMON COLD VIRUS: NOT DETECTED
CORONAVIRUS HKU1, COMMON COLD VIRUS: NOT DETECTED
CORONAVIRUS NL63, COMMON COLD VIRUS: NOT DETECTED
CORONAVIRUS OC43, COMMON COLD VIRUS: NOT DETECTED
CTP QC/QA: YES
FLUBV RNA NPH QL NAA+NON-PROBE: NOT DETECTED
HPIV1 RNA NPH QL NAA+NON-PROBE: NOT DETECTED
HPIV2 RNA NPH QL NAA+NON-PROBE: NOT DETECTED
HPIV3 RNA NPH QL NAA+NON-PROBE: NOT DETECTED
HPIV4 RNA NPH QL NAA+NON-PROBE: NOT DETECTED
HUMAN METAPNEUMOVIRUS: DETECTED
INFLUENZA A (SUBTYPES H1,H1-2009,H3): NOT DETECTED
MYCOPLASMA PNEUMONIAE: NOT DETECTED
RESPIRATORY INFECTION PANEL SOURCE: ABNORMAL
RSV RNA NPH QL NAA+NON-PROBE: NOT DETECTED
RV+EV RNA NPH QL NAA+NON-PROBE: NOT DETECTED
SARS-COV-2 RDRP RESP QL NAA+PROBE: NEGATIVE

## 2022-01-06 PROCEDURE — U0002 COVID-19 LAB TEST NON-CDC: HCPCS | Mod: QW,S$GLB,, | Performed by: STUDENT IN AN ORGANIZED HEALTH CARE EDUCATION/TRAINING PROGRAM

## 2022-01-06 PROCEDURE — 87798 DETECT AGENT NOS DNA AMP: CPT | Mod: 59 | Performed by: STUDENT IN AN ORGANIZED HEALTH CARE EDUCATION/TRAINING PROGRAM

## 2022-01-06 PROCEDURE — 99214 PR OFFICE/OUTPT VISIT, EST, LEVL IV, 30-39 MIN: ICD-10-PCS | Mod: S$GLB,,, | Performed by: STUDENT IN AN ORGANIZED HEALTH CARE EDUCATION/TRAINING PROGRAM

## 2022-01-06 PROCEDURE — U0002: ICD-10-PCS | Mod: QW,S$GLB,, | Performed by: STUDENT IN AN ORGANIZED HEALTH CARE EDUCATION/TRAINING PROGRAM

## 2022-01-06 PROCEDURE — 99214 OFFICE O/P EST MOD 30 MIN: CPT | Mod: S$GLB,,, | Performed by: STUDENT IN AN ORGANIZED HEALTH CARE EDUCATION/TRAINING PROGRAM

## 2022-01-06 PROCEDURE — 87633 RESP VIRUS 12-25 TARGETS: CPT | Performed by: STUDENT IN AN ORGANIZED HEALTH CARE EDUCATION/TRAINING PROGRAM

## 2022-01-06 RX ORDER — CODEINE PHOSPHATE AND GUAIFENESIN 10; 100 MG/5ML; MG/5ML
5 SOLUTION ORAL EVERY 6 HOURS PRN
Qty: 180 ML | Refills: 1 | Status: SHIPPED | OUTPATIENT
Start: 2022-01-06 | End: 2022-01-16

## 2022-01-06 NOTE — PROGRESS NOTES
Patient ID: Shobha Lance is a 36 y.o. female.     Chief Complaint: Cough    Cough  This is a new problem. Episode onset: 2 weeks ago. The problem has been unchanged. The problem occurs every few minutes. The cough is productive of sputum. Associated symptoms include ear pain, a fever, headaches, nasal congestion, postnasal drip and rhinorrhea. Pertinent negatives include no chest pain, chills, ear congestion, heartburn, hemoptysis, myalgias, rash, sore throat, shortness of breath, sweats, weight loss or wheezing. The symptoms are aggravated by dust. She has tried OTC cough suppressant (mucinex, OTC cold medications) for the symptoms. The treatment provided no relief.          Review of Systems  Review of Systems   Constitutional: Positive for fever. Negative for chills and weight loss.   HENT: Positive for ear pain, postnasal drip and rhinorrhea. Negative for sinus pain and sore throat.    Eyes: Negative for discharge.   Respiratory: Positive for cough. Negative for hemoptysis, shortness of breath and wheezing.    Cardiovascular: Negative for chest pain and leg swelling.   Gastrointestinal: Negative for diarrhea, heartburn, nausea and vomiting.   Genitourinary: Negative for urgency.   Musculoskeletal: Negative for myalgias.   Skin: Negative for rash.   Neurological: Positive for headaches. Negative for weakness.   Psychiatric/Behavioral: Negative for depression.   All other systems reviewed and are negative.      Currently Medications  Current Outpatient Medications on File Prior to Visit   Medication Sig Dispense Refill    amLODIPine (NORVASC) 10 MG tablet Take 10 mg by mouth once daily.      atenoloL (TENORMIN) 50 MG tablet Take 50 mg by mouth once daily.      fluticasone propionate (FLONASE) 50 mcg/actuation nasal spray USE 1 SPRAY IN EACH NOSTRIL TWICE DAILY 16 mL 3     No current facility-administered medications on file prior to visit.       Physical  Exam  Vitals:    01/06/22 0836   BP: 132/84   BP  "Location: Left arm   Patient Position: Sitting   Pulse: 89   Temp: 98 °F (36.7 °C)   SpO2: 97%   Weight: 123.8 kg (272 lb 13.1 oz)   Height: 5' 6" (1.676 m)      Body mass index is 44.03 kg/m².    Physical Exam  Vitals and nursing note reviewed.   Constitutional:       General: She is not in acute distress.     Appearance: She is not ill-appearing.   HENT:      Head: Normocephalic and atraumatic.      Right Ear: External ear normal.      Left Ear: External ear normal.      Nose: Nose normal.      Mouth/Throat:      Mouth: Mucous membranes are moist.   Eyes:      Extraocular Movements: Extraocular movements intact.      Conjunctiva/sclera: Conjunctivae normal.   Cardiovascular:      Rate and Rhythm: Normal rate and regular rhythm.      Pulses: Normal pulses.      Heart sounds: No murmur heard.      Pulmonary:      Effort: Pulmonary effort is normal. No respiratory distress.      Breath sounds: No wheezing.      Comments: Coarse breath sounds bilaterally  Abdominal:      General: There is no distension.      Palpations: Abdomen is soft. There is no mass.      Tenderness: There is no abdominal tenderness.   Musculoskeletal:         General: No swelling.      Cervical back: Normal range of motion.   Skin:     Coloration: Skin is not jaundiced.      Findings: No rash.   Neurological:      General: No focal deficit present.      Mental Status: She is alert and oriented to person, place, and time.   Psychiatric:         Mood and Affect: Mood normal.         Thought Content: Thought content normal.         Labs:    Complete Blood Count  Lab Results   Component Value Date    RBC 4.84 11/17/2020    HGB 12.5 11/17/2020    HCT 41.1 11/17/2020    MCV 85 11/17/2020    MCH 25.8 (L) 11/17/2020    MCHC 30.4 (L) 11/17/2020    RDW 12.4 11/17/2020     11/17/2020    MPV 9.5 11/17/2020    GRAN 4.0 11/17/2020    GRAN 62.8 11/17/2020    LYMPH 1.9 11/17/2020    LYMPH 30.1 11/17/2020    MONO 0.3 11/17/2020    MONO 4.6 11/17/2020    " EOS 0.1 11/17/2020    BASO 0.03 11/17/2020    EOSINOPHIL 1.8 11/17/2020    BASOPHIL 0.5 11/17/2020    DIFFMETHOD Automated 11/17/2020       Comprehensive Metabolic Panel  No results found for: GLU, BUN, CREATININE, NA, K, CL, PROT, ALBUMIN, BILITOT, AST, ALKPHOS, CO2, ALT, ANIONGAP, EGFRNONAA, ESTGFRAFRICA    TSH  No results found for: TSH    Imaging:  US Pelvis Complete Non OB  Narrative: EXAMINATION:  US PELVIS COMPLETE NON OB    CLINICAL HISTORY:  Irregular menstruation, unspecified    COMPARISON:  No comparison studies are available.    FINDINGS:  The uterus measures 8.4 x 4.2 x 2.9 cm. The uterine volume is 52 cc. Endometrial stripe measures 3 mm. Negative for endometrial or myometrial abnormalities. The visualized portions of the cervix, vagina and urinary bladder are normal.  Negative for free fluid in the cul-de-sac.    The right ovary measures 4.6 x 2.2 x 2.2 cm, and the left ovary measures 4.0 x 2.5 x 2.4 cm. Negative for ovary and or adnexal masses.    Flow velocity in the right ovary is 19 centimeters/second.  Flow velocity in the left ovary is 15 centimeters/second.  Impression: 1. Normal transabdominal pelvic ultrasound.  Uterus and ovaries appear normal.  Negative for adnexal masses.    Electronically signed by: Ray Szymanski MD  Date:    04/19/2021  Time:    16:16      Assessment/Plan:    Problem List Items Addressed This Visit    None     Visit Diagnoses     Cough    -  Primary    Relevant Medications    guaiFENesin-codeine 100-10 mg/5 ml (TUSSI-ORGANIDIN NR)  mg/5 mL syrup    Other Relevant Orders    Respiratory Infection Panel (PCR), Nasopharyngeal    POCT COVID-19 Rapid Screening (Completed)             Fran Elizabeth MD

## 2022-01-24 ENCOUNTER — PATIENT MESSAGE (OUTPATIENT)
Dept: FAMILY MEDICINE | Facility: CLINIC | Age: 37
End: 2022-01-24
Payer: COMMERCIAL

## 2022-01-24 DIAGNOSIS — R05.9 COUGH: Primary | ICD-10-CM

## 2022-01-25 RX ORDER — IPRATROPIUM BROMIDE AND ALBUTEROL SULFATE 2.5; .5 MG/3ML; MG/3ML
3 SOLUTION RESPIRATORY (INHALATION) EVERY 6 HOURS PRN
Qty: 75 ML | Refills: 0 | Status: SHIPPED | OUTPATIENT
Start: 2022-01-25 | End: 2022-09-12

## 2022-06-20 ENCOUNTER — PATIENT MESSAGE (OUTPATIENT)
Dept: FAMILY MEDICINE | Facility: CLINIC | Age: 37
End: 2022-06-20
Payer: COMMERCIAL

## 2022-06-20 DIAGNOSIS — N90.7 EPIDERMOID CYST OF LABIA MAJORA: Primary | ICD-10-CM

## 2022-07-20 ENCOUNTER — TELEPHONE (OUTPATIENT)
Dept: OBSTETRICS AND GYNECOLOGY | Facility: CLINIC | Age: 37
End: 2022-07-20
Payer: COMMERCIAL

## 2022-07-20 ENCOUNTER — TELEPHONE (OUTPATIENT)
Dept: FAMILY MEDICINE | Facility: CLINIC | Age: 37
End: 2022-07-20
Payer: COMMERCIAL

## 2022-07-20 NOTE — TELEPHONE ENCOUNTER
Contacted pt and informed Dr. Gonzalez will be out of the office on 8/1 so we need to get her rescheduled. Pt rescheduled for 9/12 at 1000. Patient verbalized understanding.

## 2022-07-21 ENCOUNTER — PATIENT MESSAGE (OUTPATIENT)
Dept: FAMILY MEDICINE | Facility: CLINIC | Age: 37
End: 2022-07-21
Payer: COMMERCIAL

## 2022-09-12 ENCOUNTER — OFFICE VISIT (OUTPATIENT)
Dept: OBSTETRICS AND GYNECOLOGY | Facility: CLINIC | Age: 37
End: 2022-09-12
Payer: COMMERCIAL

## 2022-09-12 VITALS
DIASTOLIC BLOOD PRESSURE: 60 MMHG | BODY MASS INDEX: 44.11 KG/M2 | SYSTOLIC BLOOD PRESSURE: 115 MMHG | WEIGHT: 273.25 LBS

## 2022-09-12 DIAGNOSIS — N90.89 VULVAR LUMP: Primary | ICD-10-CM

## 2022-09-12 PROCEDURE — 1159F MED LIST DOCD IN RCRD: CPT | Mod: CPTII,S$GLB,, | Performed by: OBSTETRICS & GYNECOLOGY

## 2022-09-12 PROCEDURE — 1160F RVW MEDS BY RX/DR IN RCRD: CPT | Mod: CPTII,S$GLB,, | Performed by: OBSTETRICS & GYNECOLOGY

## 2022-09-12 PROCEDURE — 3078F DIAST BP <80 MM HG: CPT | Mod: CPTII,S$GLB,, | Performed by: OBSTETRICS & GYNECOLOGY

## 2022-09-12 PROCEDURE — 1159F PR MEDICATION LIST DOCUMENTED IN MEDICAL RECORD: ICD-10-PCS | Mod: CPTII,S$GLB,, | Performed by: OBSTETRICS & GYNECOLOGY

## 2022-09-12 PROCEDURE — 99999 PR PBB SHADOW E&M-EST. PATIENT-LVL III: ICD-10-PCS | Mod: PBBFAC,,, | Performed by: OBSTETRICS & GYNECOLOGY

## 2022-09-12 PROCEDURE — 1160F PR REVIEW ALL MEDS BY PRESCRIBER/CLIN PHARMACIST DOCUMENTED: ICD-10-PCS | Mod: CPTII,S$GLB,, | Performed by: OBSTETRICS & GYNECOLOGY

## 2022-09-12 PROCEDURE — 99212 PR OFFICE/OUTPT VISIT, EST, LEVL II, 10-19 MIN: ICD-10-PCS | Mod: S$GLB,,, | Performed by: OBSTETRICS & GYNECOLOGY

## 2022-09-12 PROCEDURE — 3078F PR MOST RECENT DIASTOLIC BLOOD PRESSURE < 80 MM HG: ICD-10-PCS | Mod: CPTII,S$GLB,, | Performed by: OBSTETRICS & GYNECOLOGY

## 2022-09-12 PROCEDURE — 3074F PR MOST RECENT SYSTOLIC BLOOD PRESSURE < 130 MM HG: ICD-10-PCS | Mod: CPTII,S$GLB,, | Performed by: OBSTETRICS & GYNECOLOGY

## 2022-09-12 PROCEDURE — 3008F PR BODY MASS INDEX (BMI) DOCUMENTED: ICD-10-PCS | Mod: CPTII,S$GLB,, | Performed by: OBSTETRICS & GYNECOLOGY

## 2022-09-12 PROCEDURE — 99999 PR PBB SHADOW E&M-EST. PATIENT-LVL III: CPT | Mod: PBBFAC,,, | Performed by: OBSTETRICS & GYNECOLOGY

## 2022-09-12 PROCEDURE — 3008F BODY MASS INDEX DOCD: CPT | Mod: CPTII,S$GLB,, | Performed by: OBSTETRICS & GYNECOLOGY

## 2022-09-12 PROCEDURE — 3074F SYST BP LT 130 MM HG: CPT | Mod: CPTII,S$GLB,, | Performed by: OBSTETRICS & GYNECOLOGY

## 2022-09-12 PROCEDURE — 99212 OFFICE O/P EST SF 10 MIN: CPT | Mod: S$GLB,,, | Performed by: OBSTETRICS & GYNECOLOGY

## 2024-05-31 NOTE — PROGRESS NOTES
GYNECOLOGY OFFICE NOTE    Reason for visit: bump    HPI: Pt is a 36 y.o.  female  who presents for evaluation of bump that causes discomfort. Time for annual but pt declines annual or discussion other than evaluation of bump today. Area only painful if she is disturbing it like while riding a bike.     Past Medical History:   Diagnosis Date    Hyperlipidemia     Hypertension        History reviewed. No pertinent surgical history.    Family History   Problem Relation Age of Onset    Hypertension Father     Breast cancer Neg Hx     Colon cancer Neg Hx     Ovarian cancer Neg Hx        Social History     Tobacco Use    Smoking status: Never    Smokeless tobacco: Never   Substance Use Topics    Alcohol use: Yes    Drug use: Never       OB History    Para Term  AB Living   0 0 0 0 0 0   SAB IAB Ectopic Multiple Live Births   0 0 0 0 0       Current Outpatient Medications   Medication Sig    amLODIPine (NORVASC) 10 MG tablet Take 10 mg by mouth once daily.    atenoloL (TENORMIN) 50 MG tablet Take 50 mg by mouth once daily.    fluticasone propionate (FLONASE) 50 mcg/actuation nasal spray USE 1 SPRAY IN EACH NOSTRIL TWICE DAILY    albuterol-ipratropium (DUO-NEB) 2.5 mg-0.5 mg/3 mL nebulizer solution Take 3 mLs by nebulization every 6 (six) hours as needed for Wheezing. Rescue     No current facility-administered medications for this visit.       Allergies: Lisinopril     /60   Wt 123.9 kg (273 lb 4.2 oz)   LMP 2022 (Approximate)   BMI 44.11 kg/m²     ROS:  GENERAL: Denies fever or chills.   SKIN: Denies rash or lesions.   HEAD: Denies head injury or headache.   CHEST: Denies chest pain or shortness of breath.   CARDIOVASCULAR: Denies palpitations or chest pain.   ABDOMEN: No constipation, diarrhea, nausea, vomiting or rectal bleeding.   URINARY: No dysuria, hematuria, or burning on urination.  REPRODUCTIVE: See HPI.   BREASTS: see HPI  NEUROLOGIC: Denies syncope or weakness.  Due for med check/CPE and fasting lab, 3 month supplies sent, please help him to schedule       thx         Physical Exam:  GENERAL: alert, appears stated age and cooperative  NEUROLOGIC: orientated to person, place and time, normal mood and affect   CHEST: Normal respiratory effort  NECK: normal appearance  SKIN: no acne, hirsutism  BREAST EXAM: patient declines to have breast exam  ABDOMEN: abdomen is soft without significant tenderness, masses  EXTERNAL GENITALIA:  3cm lump proximal to left labia majora. No signs of erythema, fluctuance, small opening clogged with black head but able to see fine hair within lump as well. Offer to help but patient has car ride to [place] planned today and doesn't want to be in discomfort at this time.       Diagnosis:  1. Vulvar lump        Plan:   1. Pt states she may try to remove ingrown hair and blackhead at home. Given precautions and explained she can return for procedure if desired.It is also time for patients annual             Face to Face time with patient: 15 minutes of total time spent on the encounter, which includes face to face time and non-face to face time preparing to see the patient (eg, review of tests), Obtaining and/or reviewing separately obtained history, Documenting clinical information in the electronic or other health record, Independently interpreting results (not separately reported) and communicating results to the patient/family/caregiver, or Care coordination (not separately reported).         Tamar Gonzalez MD  OB/GYN